# Patient Record
Sex: MALE | Race: BLACK OR AFRICAN AMERICAN | Employment: OTHER
[De-identification: names, ages, dates, MRNs, and addresses within clinical notes are randomized per-mention and may not be internally consistent; named-entity substitution may affect disease eponyms.]

---

## 2024-10-23 ENCOUNTER — HOSPITAL ENCOUNTER (OUTPATIENT)
Facility: HOSPITAL | Age: 54
Setting detail: RECURRING SERIES
Discharge: HOME | End: 2024-10-26
Payer: MEDICAID

## 2024-10-23 PROCEDURE — 97161 PT EVAL LOW COMPLEX 20 MIN: CPT

## 2024-10-23 NOTE — PROGRESS NOTES
PHYSICAL / OCCUPATIONAL THERAPY - DAILY TREATMENT NOTE (updated )  For Eval visit    Patient Name: Sree Calvo    Date: 10/23/2024    : 1970  Insurance: Payor: [facility] MEDICAID / Plan: ANTHNATALIE [facility] HEALTHKEEPERS PLUS / Product Type: *No Product type* /      Patient  verified yes     Visit #   Current / Total 1 5   Time   In / Out 3:50 4:30   Pain   In / Out 0 0   Subjective Functional Status/Changes: See POC     TREATMENT AREA =  see POC    OBJECTIVE      35 min   Eval - untimed                      Therapeutic Procedures:    Tx Min Billable or 1:1 Min (if diff from Tx Min) Procedure, Rationale, Specifics   5  17645 Therapeutic Exercise (timed):  increase ROM, strength, coordination, balance, and proprioception to improve patient's ability to progress to PLOF and address remaining functional goals. (see flow sheet as applicable)     Details if applicable:                      5  [facility] Totals Reminder: bill using total billable min of TIMED therapeutic procedures (example: do not include dry needle or estim unattended, both untimed codes, in totals to left)  8-22 min = 1 unit; 23-37 min = 2 units; 38-52 min = 3 units; 53-67 min = 4 units; 68-82 min = 5 units   Total Total     [x]  Patient Education billed concurrently with other procedures   [x] Review HEP    [] Progressed/Changed HEP, detail:    [] Other detail:       Objective Information/Functional Measures/Assessment    See POC    Patient will continue to benefit from skilled PT / OT services to modify and progress therapeutic interventions, analyze and address functional mobility deficits, analyze and address ROM deficits, and analyze and address strength deficits to address functional deficits and attain remaining goals.    Progress toward goals / Updated goals:  [x]  See POC      Next PN/ RC due 24  Auth due Pending    PLAN  yes Continue plan of care  []  Other:      Tashi Lester, PT    10/23/2024    3:48 PM  If an interpreting

## 2024-10-23 NOTE — THERAPY EVALUATION
Time must be completed for valid certification **  Please sign and fax to [facility]. Thank you

## 2024-10-28 ENCOUNTER — HOSPITAL ENCOUNTER (OUTPATIENT)
Facility: HOSPITAL | Age: 54
Setting detail: RECURRING SERIES
Discharge: HOME | End: 2024-10-31
Payer: MEDICAID

## 2024-10-28 PROCEDURE — 97110 THERAPEUTIC EXERCISES: CPT

## 2024-10-28 PROCEDURE — 97112 NEUROMUSCULAR REEDUCATION: CPT

## 2024-10-28 PROCEDURE — 97530 THERAPEUTIC ACTIVITIES: CPT

## 2024-10-28 NOTE — PROGRESS NOTES
10/28/24    2. Pt. Will be able to ambulate household distances without assistive device  EVAL: Ambulates with walker    Next PN/ RC due 11/23/24  Auth due (visit number/ date) auth required after 8th visits    PLAN  - Continue Plan of Care  - Upgrade activities as tolerated    Breanne Gonzales PTA    10/28/2024    10:09 AM  If an interpreting service was utilized for treatment of this patient, the contents of this document represent the material reviewed with the patient via the .     Future Appointments   Date Time Provider Department Center   10/28/2024  5:00 PM Breanne Gonzales PTA [facility]   11/1/2024  9:00 AM Kristi Howard, PT [facility]   11/4/2024  5:00 PM Breanne Gonzales PTA [facility]   11/6/2024  2:20 PM Jaret Wiley, PT [facility]

## 2024-11-04 ENCOUNTER — HOSPITAL ENCOUNTER (OUTPATIENT)
Facility: HOSPITAL | Age: 54
Setting detail: RECURRING SERIES
Discharge: HOME | End: 2024-11-07
Payer: MEDICAID

## 2024-11-04 PROCEDURE — 97530 THERAPEUTIC ACTIVITIES: CPT

## 2024-11-04 PROCEDURE — 97116 GAIT TRAINING THERAPY: CPT

## 2024-11-04 PROCEDURE — 97110 THERAPEUTIC EXERCISES: CPT

## 2024-11-04 NOTE — PROGRESS NOTES
untimed codes, in totals to left)  8-22 min = 1 unit; 23-37 min = 2 units; 38-52 min = 3 units; 53-67 min = 4 units; 68-82 min = 5 units   Total Total     [x]  Patient Education billed concurrently with other procedures   [x] Review HEP    [] Progressed/Changed HEP, detail:    [] Other detail:       Objective Information/Functional Measures/Assessment    Patient reports majority usage of FWW but intermittent times that he ambulates without an AD within his household. He ambulated 120 feet with FWW and SBA; 120 feet with LBQC and SBA with VC for increased step width and correct AD placement/sequencing. Patient is unsure if he is D/Cing at [facility] or next week; he plans to discuss at [facility] f/u on Thursday.     Patient will continue to benefit from skilled PT / OT services to modify and progress therapeutic interventions, analyze and address functional mobility deficits, analyze and address ROM deficits, analyze and address strength deficits, analyze and address soft tissue restrictions, analyze and cue for proper movement patterns, and analyze and modify for postural abnormalities to address functional deficits and attain remaining goals.    Progress toward goals / Updated goals:  []  See Progress Note/Recertification    Short Term Goals: To be accomplished in 2 WEEKS  Pt. Will be I in HEP  EVAL: HEP issued  Current: patient reports initial compliance 10/28/24     2. Pt. Will be able to ambulate household distances without assistive device  EVAL: Ambulates with walker  Current: progressing, patient reports majority usage of FWW but intermittent times that he ambulates without an AD within his household; was able to ambulate 120 feet with LBQC in clinic 11/4/24     Next PN/ RC due 11/23/24  Auth due (visit number/ date) auth required after 8th visits    PLAN  - Continue Plan of Care    Breanne Gonzales, PTA    11/4/2024    10:00 AM  If an interpreting service was utilized for treatment of this patient, the contents of this

## 2024-11-06 ENCOUNTER — HOSPITAL ENCOUNTER (OUTPATIENT)
Facility: HOSPITAL | Age: 54
Setting detail: RECURRING SERIES
Discharge: HOME | End: 2024-11-09
Payer: MEDICAID

## 2024-11-06 PROCEDURE — 97110 THERAPEUTIC EXERCISES: CPT | Performed by: PHYSICAL THERAPIST

## 2024-11-06 PROCEDURE — 97530 THERAPEUTIC ACTIVITIES: CPT | Performed by: PHYSICAL THERAPIST

## 2024-11-06 PROCEDURE — 97116 GAIT TRAINING THERAPY: CPT | Performed by: PHYSICAL THERAPIST

## 2024-11-06 NOTE — THERAPY RECERTIFICATION
hip abd 0-10 degrees.  3. Patient will improve right hip abd to 4+/5 in order to improve his functional ADLs and return to work.  PN:  Hip abd 4/5 in available ROM.  4.  Patient will increase his LEFS score to above 60 to demonstrate increased functional activity level.  PN:  LEFS score 41/80, improved from 14/80 on IE.    Frequency / Duration:   Patient to be seen   2   times per week for   2    WEEKS    RECOMMENDATIONS  Patient would benefit from the continuation of skilled rehab interventions for functional progress to achieving above stated clinically significant goals.  Continue therapy with changes to Plan of Care to include: see updated goals above.    If you have any questions/comments please contact us directly.  Thank you for allowing us to assist in the care of your patient.    Jaret Wiley, PT       11/6/2024       1:47 PM

## 2024-11-06 NOTE — PROGRESS NOTES
PHYSICAL / OCCUPATIONAL THERAPY - DAILY TREATMENT NOTE    Patient Name: Sree Calvo    Date: 2024    : 1970  Insurance: Payor: [facility] MEDICAID / Plan: CAM [facility] HEALTHKEEPERS PLUS / Product Type: *No Product type* /      Patient  verified Yes     Visit #   Current / Total 1 4   Time   In / Out 2:20 3:03   Pain   In / Out 0 0   Subjective Functional Status/Changes: Patient reports continued use of FWW as he does not \"want to make a mistake\".  Doing better at home.  Independent in his self care.     TREATMENT AREA =  Right hip pain [M25.551]     OBJECTIVE    Therapeutic Procedures:    65110 Therapeutic Exercise (timed):  increase ROM, strength, coordination, balance, and proprioception to improve patient's ability to progress to PLOF and address remaining functional goals.   Tx Min Billable or 1:1 Min   (if diff from Tx Min) Details:   25  See flow sheet as applicable     32924 Therapeutic Activity (timed):  use of dynamic activities replicating functional movements to increase ROM, strength, coordination, balance, and proprioception in order to improve patient's ability to progress to PLOF and address remaining functional goals.   Tx Min Billable or 1:1 Min   (if diff from Tx Min) Details:   10  See flow sheet as applicable     12512 Gait Training (timed):    80 feet with Single point cane (assistive device) over level surfaces with supervisory level of assist. Cuing for advancement of cane and LE.  To improve safety and dynamic movement with household/community ambulation.    Tx Min Billable or 1:1 Min   (if diff from Tx Min) Details:   8  See flow sheet as applicable       43  [facility] Totals Reminder: bill using total billable min of TIMED therapeutic procedures (example: do not include dry needle or estim unattended, both untimed codes, in totals to left)  8-22 min = 1 unit; 23-37 min = 2 units; 38-52 min = 3 units; 53-67 min = 4 units; 68-82 min = 5 units   Total Total     [x]  Patient

## 2024-11-08 ENCOUNTER — APPOINTMENT (OUTPATIENT)
Facility: HOSPITAL | Age: 54
End: 2024-11-08
Payer: MEDICAID

## 2024-11-15 ENCOUNTER — HOSPITAL ENCOUNTER (OUTPATIENT)
Facility: HOSPITAL | Age: 54
Setting detail: RECURRING SERIES
Discharge: HOME | End: 2024-11-18
Payer: MEDICAID

## 2024-11-15 PROCEDURE — 97110 THERAPEUTIC EXERCISES: CPT

## 2024-11-15 PROCEDURE — 97535 SELF CARE MNGMENT TRAINING: CPT

## 2024-11-15 PROCEDURE — 97530 THERAPEUTIC ACTIVITIES: CPT

## 2024-11-15 PROCEDURE — 97112 NEUROMUSCULAR REEDUCATION: CPT

## 2024-11-15 NOTE — PROGRESS NOTES
PHYSICAL / OCCUPATIONAL THERAPY - DAILY TREATMENT NOTE    Patient Name: Sree Calvo    Date: 11/15/2024    : 1970  Insurance: Payor: [facility] MEDICAID / Plan: CAM [facility] HEALTHKEEPERS PLUS / Product Type: *No Product type* /      Patient  verified Yes     Visit #   Current / Total 3 4   Time   In / Out 1140 1233   Pain   In / Out 0 0   Subjective Functional Status/Changes: Patient states that he is \"walking naturally\" now. He continues to negotiate stairs in a step-to pattern, especially during descent.      TREATMENT AREA =  Right hip pain [M25.551]     OBJECTIVE         Therapeutic Procedures:    Tx Min Billable or 1:1 Min (if diff from Tx Min) Procedure, Rationale, Specifics   15 15 31463 Therapeutic Exercise (timed):  increase ROM, strength, coordination, balance, and proprioception to improve patient's ability to progress to PLOF and address remaining functional goals. (see flow sheet as applicable)     Details if applicable:       15 15 53039 Therapeutic Activity (timed):  use of dynamic activities replicating functional movements to increase ROM, strength, coordination, balance, and proprioception in order to improve patient's ability to progress to PLOF and address remaining functional goals.  (see flow sheet as applicable)     Details if applicable:     15 15 75095 Neuromuscular Re-Education (timed):  improve balance, coordination, kinesthetic sense, posture, core stability and proprioception to improve patient's ability to develop conscious control of individual muscles and awareness of position of extremities in order to progress to PLOF and address remaining functional goals. (see flow sheet as applicable)     Details if applicable:     8 8 30071 Self Care/Home Management (timed):  improve patient knowledge and understanding of activity modification and D/C planning  to improve patient's ability to progress to PLOF and address remaining functional goals.  (see flow sheet as applicable)

## 2024-11-18 ENCOUNTER — HOSPITAL ENCOUNTER (OUTPATIENT)
Facility: HOSPITAL | Age: 54
Setting detail: RECURRING SERIES
Discharge: HOME | End: 2024-11-21
Payer: MEDICAID

## 2024-11-18 PROCEDURE — 97112 NEUROMUSCULAR REEDUCATION: CPT

## 2024-11-18 PROCEDURE — 97530 THERAPEUTIC ACTIVITIES: CPT

## 2024-11-18 PROCEDURE — 97110 THERAPEUTIC EXERCISES: CPT

## 2024-11-18 NOTE — DISCHARGE SUMMARY
used an AD in the last 2 days 11/11/24     2. Patient will increase his AROM right hip abd to 0-20 degrees to improve his functional motion.  PN:  AROM right hip abd 0-10 degrees.  Current: MET, 0-20 deg 11/15/24     3. Patient will improve right hip abd to 4+/5 in order to improve his functional ADLs and return to work.  PN:  Hip abd 4/5 in available ROM.  Current: MET, 4+/5 R hip abd MMT 11/15/24     4.  Patient will increase his LEFS score to above 60 to demonstrate increased functional activity level.  PN:  LEFS score 41/80, improved from 14/80 on IE.  Current: nearly met, 60/80       RECOMMENDATIONS  Discontinue therapy. Progressing towards or have reached established goals.    Breanne Gonzales, PTA       11/18/2024       9:47 AM  If an interpreting service was utilized for treatment of this patient, the contents of this document represent the material reviewed with the patient via the .     Payor: [facility] MEDICAID / Plan: CAM [facility] HEALTHKEEPERS PLUS / Product Type: *No Product type* /      Medicaid Ins, signature required for DC ** NOTE TO PHYSICIAN:  Your patient's insurance requires this discharge note be signed and returned **    ___ I have read the above report and request that my patient be discharged from therapy.     Physician's Signature:_________________________   DATE:_________   TIME:________                           Paige Mac PA-C    ** Signature, Date and Time must be completed for valid certification **  Please sign and fax to [facility]

## 2025-02-05 ENCOUNTER — HOSPITAL ENCOUNTER (OUTPATIENT)
Facility: HOSPITAL | Age: 55
Setting detail: RECURRING SERIES
Discharge: HOME | End: 2025-02-08
Payer: MEDICAID

## 2025-02-05 PROCEDURE — 97162 PT EVAL MOD COMPLEX 30 MIN: CPT | Performed by: PHYSICAL THERAPIST

## 2025-02-05 NOTE — THERAPY EVALUATION
[facility]  [address] [facility] [address] Ph: [phone] Fx: [phone]  Plan of Care / Statement of Necessity for Physical Therapy Services     Patient Name: Sree Calvo : 1970   Medical   Diagnosis: Left hip pain [M25.552]  Treatment Diagnosis:   M25.552  LEFT HIP PAIN     Onset Date: 2/3/2025     Referral Source: Armand Lloyd MD Start of Care (SOC): 2025   Prior Hospitalization: See medical history Provider #: [id number]   Prior Level of Function: Was working on cars, did \"everything\".    Comorbidities:  Musculoskeletal disorders and Other: HTN     Assessment / key information:  Patient with signs and symptoms consistent with left hip pain 2 days post op left FILOMENA.  Patient notes stiffness more than pain at this time.   He presents to therapy with a FWW and notes he is full weight bearing.      ROM/Strength               AROM                          Strength (1-5)  Hip Left Right Left Right   Flexion 90 87 4 4   Extension NT NT NT NT   Abduction 10 15 4 4-   Adduction NT NT 4 4+   ER 0 15 2 4-   IR 25 15 4 4   Knee Left Right Left Right   Extension 0 0 5 5   Flexion 123 125 5 5   Functionally, he is not doing much but wants to get back to walking nomrally and to his job as a .    Patient will benefit from a program of skilled physical therapy to include therapeutic exercises to address strength deficits, therapeutic activities to improve functional mobility, neuromuscular reeducation to address balance, coordination and proprioception, manual therapy to address ROM and tissue extensibility and modalities as indicated.  All questions were answered.      Post operative: URGENT: Patient was evaluated for a post operative condition and early physical therapy intervention is critical to positive outcomes.  Insurance authorization should be expedited to prevent delay in treatment.      Evaluation Complexity:  History:  MEDIUM

## 2025-02-05 NOTE — PROGRESS NOTES
PT DAILY TREATMENT NOTE/HIP [id number]      Patient Name: Sree Calvo    Date: 2025    : 1970  Insurance: Payor: [facility] MEDICAID / Plan: CAM [facility] HEALTHKEEPERS PLUS / Product Type: *No Product type* /      Patient  verified yes     Visit #   Current / Total 1 24   Time   In / Out 9:40 10:20   Pain   In / Out 0 0   Subjective Functional Status/Changes: Patient reports he has not been having any pain but describes having tightness in the hip.     Treatment Area: Left hip pain [M25.552]    SUBJECTIVE  Pain Level (0-10 scale): 0/10 now; 0/10 at best; 0/10 at worst.  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  Limitations to PLOF: Patient just had surgery 2/3/2025 and is not doing much of anything.  Mechanism of Injury: OA left hip, underwent left FILOMENA.  Current symptoms/Complaints: Patient with CC of tightness in his left hip.  He is walking with a FWW but lifts it and places it rather than rolls it.  Doing steps one at a time.  Patient describes an anterior approach was done.  Previous Treatment/Compliance: Some therapy at the hospital.  PMHx/Surgical Hx: HTN, right FILOMENA 10/2024.    Work Hx:    Living Situation: has steps to get up to his bedroom.  Pt Goals: \"To walk like I was\".  Barriers: []pain []financial []time []transportation [x]Other: post op  Cognition: A & O x 3    Other:    OBJECTIVE/EXAMINATION  Domestic Life: Lives with some friends right now.  Activity/Recreational Limitations: limited ambulation, steps one at a time.  Mobility: Using a FWW with WBTT.  Self Care: Independent.    33 min [x]Eval - untimed                      Therapeutic Procedures:  Tx Min Billable or 1:1 Min (if diff from Tx Min) Procedure, Rationale, Specifics   7  79746 Therapeutic Exercise (timed):  increase ROM, strength, coordination, balance, and proprioception to improve patient's

## 2025-02-10 ENCOUNTER — HOSPITAL ENCOUNTER (OUTPATIENT)
Facility: HOSPITAL | Age: 55
Setting detail: RECURRING SERIES
Discharge: HOME | End: 2025-02-13
Payer: MEDICAID

## 2025-02-10 PROCEDURE — 97530 THERAPEUTIC ACTIVITIES: CPT

## 2025-02-10 PROCEDURE — 97110 THERAPEUTIC EXERCISES: CPT

## 2025-02-10 PROCEDURE — 97112 NEUROMUSCULAR REEDUCATION: CPT

## 2025-02-10 NOTE — PROGRESS NOTES
PHYSICAL / OCCUPATIONAL THERAPY - DAILY TREATMENT NOTE    Patient Name: Sree Calvo    Date: 2/10/2025    : 1970  Insurance: Payor: [facility] MEDICAID / [facility] / Product Type: *No Product type* /      Patient  verified Yes     Visit #   Current / Total 2 24   Time   In / Out 140 219   Pain   In / Out 0 0 \"looser\"   Subjective Functional Status/Changes: Patient presents ambulating with FWW. He reports increased tightness on L compared to his prior R hip surgery but also reports limited compliance with HEP.      TREATMENT AREA =  Left hip pain [M25.552]     OBJECTIVE         Therapeutic Procedures:    Tx Min Billable or 1:1 Min (if diff from Tx Min) Procedure, Rationale, Specifics   15 15 88569 Therapeutic Exercise (timed):  increase ROM, strength, coordination, balance, and proprioception to improve patient's ability to progress to PLOF and address remaining functional goals. (see flow sheet as applicable)     Details if applicable:       97112 Neuromuscular Re-Education (timed):  improve balance, coordination, kinesthetic sense, posture, core stability and proprioception to improve patient's ability to develop conscious control of individual muscles and awareness of position of extremities in order to progress to PLOF and address remaining functional goals. (see flow sheet as applicable)     Details if applicable:     15 15 62525 Therapeutic Activity (timed):  use of dynamic activities replicating functional movements to increase ROM, strength, coordination, balance, and proprioception in order to improve patient's ability to progress to PLOF and address remaining functional goals.  (see flow sheet as applicable)     Details if applicable:  including gait with FWW and SBA on level terrain approximately 80'   x x x   x x x   39 39 [facility] Totals Reminder: bill using total billable min of TIMED therapeutic procedures (example: do not include dry needle or estim

## 2025-02-14 ENCOUNTER — HOSPITAL ENCOUNTER (OUTPATIENT)
Facility: HOSPITAL | Age: 55
Setting detail: RECURRING SERIES
Discharge: HOME | End: 2025-02-17
Payer: MEDICAID

## 2025-02-14 PROCEDURE — 97110 THERAPEUTIC EXERCISES: CPT

## 2025-02-14 PROCEDURE — 97530 THERAPEUTIC ACTIVITIES: CPT

## 2025-02-14 PROCEDURE — 97112 NEUROMUSCULAR REEDUCATION: CPT

## 2025-02-14 NOTE — PROGRESS NOTES
PHYSICAL / OCCUPATIONAL THERAPY - DAILY TREATMENT NOTE    Patient Name: Sree Calvo    Date: 2025    : 1970  Insurance: Payor: [facility] MEDICAID / Plan: ANTHNATALIE [facility] HEALTHKEEPERS PLUS / Product Type: *No Product type* /      Patient  verified Yes     Visit #   Current / Total 3 24   Time   In / Out 1:40 2:18   Pain   In / Out 0 0   Subjective Functional Status/Changes: Patient notes no issues with home program. He is compliant with program and has been doing exercises overall at home. No increase in soreness post session. He reports he is now driving himself to therapy no reported issues. Goes back to MD on .       TREATMENT AREA =  Left hip pain [M25.552]     OBJECTIVE      Therapeutic Procedures:    Tx Min Billable or 1:1 Min (if diff from Tx Min) Procedure, Rationale, Specifics   15  79180 Therapeutic Exercise (timed):  increase ROM, strength, coordination, balance, and proprioception to improve patient's ability to progress to PLOF and address remaining functional goals. (see flow sheet as applicable)     Details if applicable:  see flow sheet     15  97788 Neuromuscular Re-Education (timed):  improve balance, coordination, kinesthetic sense, posture, core stability and proprioception to improve patient's ability to develop conscious control of individual muscles and awareness of position of extremities in order to progress to PLOF and address remaining functional goals. (see flow sheet as applicable)     Details if applicable:  see flow sheet    8  66074 Therapeutic Activity (timed):  use of dynamic activities replicating functional movements to increase ROM, strength, coordination, balance, and proprioception in order to improve patient's ability to progress to PLOF and address remaining functional goals.  (see flow sheet as applicable)     Details if applicable: see flow sheet    38  [facility] Totals Reminder: bill using total billable min of TIMED therapeutic procedures (example:

## 2025-02-17 ENCOUNTER — HOSPITAL ENCOUNTER (OUTPATIENT)
Facility: HOSPITAL | Age: 55
Setting detail: RECURRING SERIES
Discharge: HOME | End: 2025-02-20
Payer: MEDICAID

## 2025-02-17 PROCEDURE — 97110 THERAPEUTIC EXERCISES: CPT

## 2025-02-17 PROCEDURE — 97112 NEUROMUSCULAR REEDUCATION: CPT

## 2025-02-17 PROCEDURE — 97530 THERAPEUTIC ACTIVITIES: CPT

## 2025-02-17 NOTE — PROGRESS NOTES
PHYSICAL / OCCUPATIONAL THERAPY - DAILY TREATMENT NOTE    Patient Name: Sree Calvo    Date: 2025    : 1970  Insurance: Payor: [facility] MEDICAID / Plan: CAM [facility] HEALTHKEEPERS PLUS / Product Type: *No Product type* /      Patient  verified Yes     Visit #   Current / Total 4 24   Time   In / Out 1020 1101   Pain   In / Out 0 0   Subjective Functional Status/Changes: Patient reports that he has been ambulating short distances without his walker; \"I've got a little lean but it's ok\".      TREATMENT AREA =  Left hip pain [M25.552]     OBJECTIVE      Therapeutic Procedures:    Tx Min Billable or 1:1 Min (if diff from Tx Min) Procedure, Rationale, Specifics   15 15 44124 Therapeutic Exercise (timed):  increase ROM, strength, coordination, balance, and proprioception to improve patient's ability to progress to PLOF and address remaining functional goals. (see flow sheet as applicable)     Details if applicable:  see flow sheet     11 11 07420 Neuromuscular Re-Education (timed):  improve balance, coordination, kinesthetic sense, posture, core stability and proprioception to improve patient's ability to develop conscious control of individual muscles and awareness of position of extremities in order to progress to PLOF and address remaining functional goals. (see flow sheet as applicable)     Details if applicable:  see flow sheet    15 15 54471 Therapeutic Activity (timed):  use of dynamic activities replicating functional movements to increase ROM, strength, coordination, balance, and proprioception in order to improve patient's ability to progress to PLOF and address remaining functional goals.  (see flow sheet as applicable)     Details if applicable: see flow sheet ; including gait trainin feet on level terrain with QC and SBA, with VC for appropriate sequencing and reciprocal gait pattern   41 41 [facility] Totals Reminder: bill using total billable min of TIMED therapeutic procedures

## 2025-02-21 ENCOUNTER — HOSPITAL ENCOUNTER (OUTPATIENT)
Facility: HOSPITAL | Age: 55
Setting detail: RECURRING SERIES
Discharge: HOME | End: 2025-02-24
Payer: MEDICAID

## 2025-02-21 PROCEDURE — 97112 NEUROMUSCULAR REEDUCATION: CPT

## 2025-02-21 PROCEDURE — 97110 THERAPEUTIC EXERCISES: CPT

## 2025-02-21 PROCEDURE — 97530 THERAPEUTIC ACTIVITIES: CPT

## 2025-02-21 NOTE — PROGRESS NOTES
PHYSICAL / OCCUPATIONAL THERAPY - DAILY TREATMENT NOTE    Patient Name: Sree Calvo    Date: 2025    : 1970  Insurance: Payor: [facility] MEDICAID / [facility] / Product Type: *No Product type* /      Patient  verified Yes     Visit #   Current / Total 5 24   Time   In / Out 1126 1206   Pain   In / Out 0 0   Subjective Functional Status/Changes: Patient states he has been doing well walking without his walker without having increase in pain.      TREATMENT AREA =  Left hip pain [M25.552]     OBJECTIVE    Therapeutic Procedures:    28366 Therapeutic Exercise (timed):  increase ROM, strength, coordination, balance, and proprioception to improve patient's ability to progress to PLOF and address remaining functional goals.   Tx Min Billable or 1:1 Min   (if diff from Tx Min) Details:   15  See flow sheet as applicable     00430 Neuromuscular Re-Education (timed):  improve balance, coordination, kinesthetic sense, posture, core stability and proprioception to improve patient's ability to develop conscious control of individual muscles and awareness of position of extremities in order to progress to PLOF and address remaining functional goals.   Tx Min Billable or 1:1 Min   (if diff from Tx Min) Details:   15  See flow sheet as applicable     85065 Therapeutic Activity (timed):  use of dynamic activities replicating functional movements to increase ROM, strength, coordination, balance, and proprioception in order to improve patient's ability to progress to PLOF and address remaining functional goals.   Tx Min Billable or 1:1 Min   (if diff from Tx Min) Details:   10  See flow sheet as applicable       40  [facility] Totals Reminder: bill using total billable min of TIMED therapeutic procedures (example: do not include dry needle or estim unattended, both untimed codes, in totals to left)  8-22 min = 1 unit; 23-37 min = 2 units; 38-52 min = 3 units; 53-67 min = 4 units; 68-82 min = 5

## 2025-02-24 ENCOUNTER — HOSPITAL ENCOUNTER (OUTPATIENT)
Facility: HOSPITAL | Age: 55
Setting detail: RECURRING SERIES
Discharge: HOME | End: 2025-02-27
Payer: MEDICAID

## 2025-02-24 PROCEDURE — 97110 THERAPEUTIC EXERCISES: CPT

## 2025-02-24 PROCEDURE — 97112 NEUROMUSCULAR REEDUCATION: CPT

## 2025-02-24 PROCEDURE — 97530 THERAPEUTIC ACTIVITIES: CPT

## 2025-02-24 NOTE — PROGRESS NOTES
PHYSICAL / OCCUPATIONAL THERAPY - DAILY TREATMENT NOTE    Patient Name: Sree Calvo    Date: 2025    : 1970  Insurance: Payor: [facility] MEDICAID / [facility] / Product Type: *No Product type* /      Patient  verified Yes     Visit #   Current / Total 6 24   Time   In / Out 1019 1103   Pain   In / Out 0 0   Subjective Functional Status/Changes: Patient states that he has not used his FWW since last Tuesday.       TREATMENT AREA =  Left hip pain [M25.552]     OBJECTIVE    Therapeutic Procedures:    30230 Therapeutic Exercise (timed):  increase ROM, strength, coordination, balance, and proprioception to improve patient's ability to progress to PLOF and address remaining functional goals.   Tx Min Billable or 1:1 Min   (if diff from Tx Min) Details:   15 15 See flow sheet as applicable     94843 Neuromuscular Re-Education (timed):  improve balance, coordination, kinesthetic sense, posture, core stability and proprioception to improve patient's ability to develop conscious control of individual muscles and awareness of position of extremities in order to progress to PLOF and address remaining functional goals.   Tx Min Billable or 1:1 Min   (if diff from Tx Min) Details:   14 14 See flow sheet as applicable     36944 Therapeutic Activity (timed):  use of dynamic activities replicating functional movements to increase ROM, strength, coordination, balance, and proprioception in order to improve patient's ability to progress to PLOF and address remaining functional goals.   Tx Min Billable or 1:1 Min   (if diff from Tx Min) Details:   15 15 See flow sheet as applicable       44 44 [facility] Totals Reminder: bill using total billable min of TIMED therapeutic procedures (example: do not include dry needle or estim unattended, both untimed codes, in totals to left)  8-22 min = 1 unit; 23-37 min = 2 units; 38-52 min = 3 units; 53-67 min = 4 units; 68-82 min = 5 units   Total Total

## 2025-02-28 ENCOUNTER — HOSPITAL ENCOUNTER (OUTPATIENT)
Facility: HOSPITAL | Age: 55
Setting detail: RECURRING SERIES
End: 2025-02-28
Payer: MEDICAID

## 2025-02-28 PROCEDURE — 97110 THERAPEUTIC EXERCISES: CPT

## 2025-02-28 PROCEDURE — 97530 THERAPEUTIC ACTIVITIES: CPT

## 2025-02-28 PROCEDURE — 97535 SELF CARE MNGMENT TRAINING: CPT

## 2025-02-28 NOTE — THERAPY RECERTIFICATION
[facility]  [address] [address] Ph: [phone] Fx: [phone]  PHYSICAL THERAPY PROGRESS NOTE  Patient Name: Sree Calvo : 1970   Treatment/Medical Diagnosis: Left hip pain [M25.552]   Referral Source: Armand Lloyd MD     Date of Initial Visit: 25 Attended Visits: 7 Missed Visits: 0     SUMMARY OF TREATMENT  Mr. Calvo presents for his 7th visit including IE with report of 90% improvement since his L FILOMENA on 2/3/25. He reports improved ease of stair negotiation and sleep positioning, increased standing and ambulation tolerance, improved ease of completion of ADLs/BADLS such as cooking a meal, donning/doffing footwear, and/or vacuuming, and abolished reliance on use of a FWW during gait. Although improved, he does report increased difficulty during stair descent >ascent. Additionally, he exhibits Trendelenburg compensation during gait and a limited SLS balance time of 17\" (L) on uneven terrain, indicating continued hip and gluteal weakness and stability deficits. Mr. Calvo is an , and is currently unable to transfer from standing<>supine without reliance on UE support. He would continue to benefit from skilled PT intervention 2x/week for 4 weeks to continue to increase his functional strength and stability for improved gait mechanics and functional transferring for a return to OF.     CURRENT STATUS  Short Term Goals: To be accomplished in 2 WEEKS  1.  Patient will become proficient in their HEP and will be compliant in performing that program.  Evaluation:   Patient given a written/illustrated HEP.  Current: reports limited compliance 2/10/25. Goal met.      2.  Patient will report little to no difficulty walking between rooms to improve his functional ambulation.  Evaluation:  Notes moderate difficulty walking between rooms.   Current; met, reports little difficulty walking between rooms 25     Long

## 2025-02-28 NOTE — PROGRESS NOTES
Yaquelin Gonzlaes PTA    2/28/2025    9:44 AM  If an interpreting service was utilized for treatment of this patient, the contents of this document represent the material reviewed with the patient via the .     Future Appointments   Date Time Provider Department Center   2/28/2025 10:20 AM [facility]   3/3/2025 10:20 AM [facility]

## 2025-03-03 ENCOUNTER — HOSPITAL ENCOUNTER (OUTPATIENT)
Facility: HOSPITAL | Age: 55
Setting detail: RECURRING SERIES
Discharge: HOME | End: 2025-03-06
Payer: MEDICAID

## 2025-03-03 PROCEDURE — 97110 THERAPEUTIC EXERCISES: CPT

## 2025-03-03 PROCEDURE — 97530 THERAPEUTIC ACTIVITIES: CPT

## 2025-03-03 PROCEDURE — 97112 NEUROMUSCULAR REEDUCATION: CPT

## 2025-03-03 NOTE — PROGRESS NOTES
ongoing, 3/3/25    5. Patient will ambulate 200 feet with minimal gait deviations independently for improved fluidity during gait.   PN: Trendelenburg compensation noted during gait    Next PN/ RC due 3/28/25  Auth due (visit number/ date) pending; 0 visits remaining after today    PLAN  - Continue Plan of Care  - Upgrade activities as tolerated    Breanne Gonzales PTA    3/3/2025    9:47 AM  If an interpreting service was utilized for treatment of this patient, the contents of this document represent the material reviewed with the patient via the .     Future Appointments   Date Time Provider Department Center   3/3/2025 10:20 AM Breanne Gonzales PTA [facility]

## 2025-03-07 ENCOUNTER — HOSPITAL ENCOUNTER (OUTPATIENT)
Facility: HOSPITAL | Age: 55
Setting detail: RECURRING SERIES
Discharge: HOME | End: 2025-03-10
Payer: MEDICAID

## 2025-03-07 PROCEDURE — 97530 THERAPEUTIC ACTIVITIES: CPT

## 2025-03-07 PROCEDURE — 97112 NEUROMUSCULAR REEDUCATION: CPT

## 2025-03-07 PROCEDURE — 97110 THERAPEUTIC EXERCISES: CPT

## 2025-03-07 NOTE — PROGRESS NOTES
PHYSICAL / OCCUPATIONAL THERAPY - DAILY TREATMENT NOTE    Patient Name: Sree Calvo    Date: 3/7/2025    : 1970  Insurance: Payor: [facility] MEDICAID / Plan: [facility] HEALTHKEEPERS PLUS / Product Type: *No Product type* /      Patient  verified Yes     Visit #   Current / Total 9 24   Time   In / Out 1:42 2:31   Pain   In / Out 0 0   Subjective Functional Status/Changes: Patient reports no changes since last visit. Planning to finish out therapy by next week and discharge next week Friday. He notes he is cautious with transitions on and off the floor.      TREATMENT AREA =  Left hip pain [M25.552]     OBJECTIVE    Therapeutic Procedures:    Tx Min Billable or 1:1 Min (if diff from Tx Min) Procedure, Rationale, Specifics   19  72322 Therapeutic Exercise (timed):  increase ROM, strength, coordination, balance, and proprioception to improve patient's ability to progress to PLOF and address remaining functional goals. (see flow sheet as applicable)     Details if applicable:   see flow sheet      15  15686 Neuromuscular Re-Education (timed):  improve balance, coordination, kinesthetic sense, posture, core stability and proprioception to improve patient's ability to develop conscious control of individual muscles and awareness of position of extremities in order to progress to PLOF and address remaining functional goals. (see flow sheet as applicable)     Details if applicable:   see flow sheet    15  73145 Therapeutic Activity (timed):  use of dynamic activities replicating functional movements to increase ROM, strength, coordination, balance, and proprioception in order to improve patient's ability to progress to PLOF and address remaining functional goals.  (see flow sheet as applicable)     Details if applicable:  see flow sheet    49  [facility] Totals Reminder: bill using total billable min of TIMED therapeutic procedures (example: do not include dry needle or estim unattended, both untimed codes, in

## 2025-03-10 ENCOUNTER — HOSPITAL ENCOUNTER (OUTPATIENT)
Facility: HOSPITAL | Age: 55
Setting detail: RECURRING SERIES
Discharge: HOME | End: 2025-03-13
Payer: MEDICAID

## 2025-03-10 PROCEDURE — 97530 THERAPEUTIC ACTIVITIES: CPT

## 2025-03-10 PROCEDURE — 97112 NEUROMUSCULAR REEDUCATION: CPT

## 2025-03-10 PROCEDURE — 97110 THERAPEUTIC EXERCISES: CPT

## 2025-03-10 NOTE — PROGRESS NOTES
PHYSICAL / OCCUPATIONAL THERAPY - DAILY TREATMENT NOTE    Patient Name: Sree Calvo    Date: 3/10/2025    : 1970  Insurance: Payor: [facility] MEDICAID / Plan: [facility] HEALTHKEEPERS PLUS / Product Type: *No Product type* /      Patient  verified Yes     Visit #   Current / Total 3 8   Time   In / Out 821 859   Pain   In / Out 0 0   Subjective Functional Status/Changes: Patient states he has little difficulty getting down on the ground and getting back up. Patient states he had to get down on the ground to help work on a car over the weekend.      TREATMENT AREA =  Left hip pain    OBJECTIVE        Therapeutic Procedures:      52680 Therapeutic Exercise (timed):  increase ROM, strength, coordination, balance, and proprioception to improve patient's ability to progress to PLOF and address remaining functional goals.   Tx Min Billable or 1:1 Min   (if diff from Tx Min) Details:   15   See flow sheet as applicable      09931 Neuromuscular Re-Education (timed):  improve balance, coordination, kinesthetic sense, posture, core stability and proprioception to improve patient's ability to develop conscious control of individual muscles and awareness of position of extremities in order to progress to PLOF and address remaining functional goals.   Tx Min Billable or 1:1 Min   (if diff from Tx Min) Details:   15   See flow sheet as applicable      39544 Therapeutic Activity (timed):  use of dynamic activities replicating functional movements to increase ROM, strength, coordination, balance, and proprioception in order to improve patient's ability to progress to PLOF and address remaining functional goals.   Tx Min Billable or 1:1 Min   (if diff from Tx Min) Details:   8   See flow sheet as applicable         38   [facility] Totals Reminder: bill using total billable min of TIMED therapeutic procedures (example: do not include dry needle or estim unattended, both untimed codes, in totals to left)  8-22 min = 1 unit;

## 2025-03-14 ENCOUNTER — HOSPITAL ENCOUNTER (OUTPATIENT)
Facility: HOSPITAL | Age: 55
Setting detail: RECURRING SERIES
Discharge: HOME | End: 2025-03-17
Payer: MEDICAID

## 2025-03-14 PROCEDURE — 97110 THERAPEUTIC EXERCISES: CPT

## 2025-03-14 PROCEDURE — 97112 NEUROMUSCULAR REEDUCATION: CPT

## 2025-03-14 PROCEDURE — 97530 THERAPEUTIC ACTIVITIES: CPT

## 2025-03-14 NOTE — THERAPY DISCHARGE
[facility]HL[facility]  [address] [address] Ph: [phone] Fx: [phone]  DISCHARGE SUMMARY  Patient Name: Sree Calvo : 1970   Medical/Treatment Diagnosis: Left hip pain   Referral Source: Armand Lloyd MD     Date of Initial Visit: 2025 Attended Visits: 11 Missed Visits: 0     SUMMARY OF TREATMENT  Patient reports 100% improvement with overall symptoms of left hip. Patient has improved with sit to stand transition, but continue to require assistance from chairs when transitioning from floor to stand.        CURRENT STATUS     1. Patient will complete 12 STS in 30\" with no DVC  to demonstrate improved ease of functional transferring from sitting to standing.   PN: 9 reps from standard height plinth with DVC (R>L)  Current: MET: 14 STS in 30\".      2 Patient will increase LEFS score to 75/80 to indicate increased functional mobility.  PN:  69/80  Current: MET: 77/80 3/14/25     3. Patient will complete 30\" SLS B on airex to indicate improved stability on uneven terrain.   PN: SLS on airex: R: 22\", L: 17\" (increased ankle strategy noted throughout)   Current: SLS on airex pad R 30 seconds, L 30 seconds, increase ankle strategy with both. Goal met. 3/7/25.     4. Patient will complete 1 floor transfer from standing <> supine on each side without use of outside support for improved ease of work duty completion as .   PN: completes transfers with use of UE/pushoff of car/plinth  Current: remains: patient reports having to use car to push himself off the ground 3/10/24     5. Patient will ambulate 200 feet with minimal gait deviations independently for improved fluidity during gait.   PN: Trendelenburg compensation noted during gait  Current:MET: pt ambulates with normal gait pattern. 3/14/2025         non-Medicare    RECOMMENDATIONS  Discontinue therapy. Progressing towards or have reached established goals.    If you have any

## 2025-03-14 NOTE — PROGRESS NOTES
service was utilized for treatment of this patient, the contents of this document represent the material reviewed with the patient via the .     No future appointments.